# Patient Record
Sex: FEMALE | Race: WHITE | NOT HISPANIC OR LATINO | URBAN - METROPOLITAN AREA
[De-identification: names, ages, dates, MRNs, and addresses within clinical notes are randomized per-mention and may not be internally consistent; named-entity substitution may affect disease eponyms.]

---
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
## 2018-01-13 VITALS
HEIGHT: 65 IN | HEART RATE: 76 BPM | WEIGHT: 123.3 LBS | RESPIRATION RATE: 16 BRPM | TEMPERATURE: 98.2 F | BODY MASS INDEX: 20.54 KG/M2 | SYSTOLIC BLOOD PRESSURE: 118 MMHG | DIASTOLIC BLOOD PRESSURE: 60 MMHG

## 2018-01-14 VITALS
SYSTOLIC BLOOD PRESSURE: 116 MMHG | HEART RATE: 100 BPM | DIASTOLIC BLOOD PRESSURE: 70 MMHG | TEMPERATURE: 98.3 F | BODY MASS INDEX: 21.96 KG/M2 | RESPIRATION RATE: 14 BRPM | WEIGHT: 131.8 LBS | HEIGHT: 65 IN

## 2018-01-14 NOTE — MISCELLANEOUS
Provider Comments  Patient a no show for 06/30/17 OV; letter sent   C Draper        Signatures   Electronically signed by : Vernell Velasquez, [facility]; Jul  3 2017  9:42AM EST                       (Author)

## 2018-06-16 DIAGNOSIS — Z30.9 ENCOUNTER FOR CONTRACEPTIVE MANAGEMENT, UNSPECIFIED TYPE: Primary | ICD-10-CM

## 2018-06-16 RX ORDER — DROSPIRENONE AND ETHINYL ESTRADIOL TABLETS 0.02-3(28)
KIT ORAL
Refills: 0 | COMMUNITY
Start: 2018-05-14 | End: 2018-06-16 | Stop reason: SDUPTHER

## 2018-06-16 RX ORDER — DROSPIRENONE AND ETHINYL ESTRADIOL TABLETS 0.02-3(28)
KIT ORAL
Qty: 28 TABLET | Refills: 0 | Status: SHIPPED | OUTPATIENT
Start: 2018-06-16 | End: 2018-06-28 | Stop reason: SDUPTHER

## 2018-06-16 NOTE — PROGRESS NOTES
Patient called for birth control pills because she is post to start tomorrow Sunday     Patient not seen in greater than a year so 1 pack will be called in and then she needs to follow up with Gallito Gramajo prior to another p

## 2018-06-16 NOTE — TELEPHONE ENCOUNTER
Pt needs her birth control  Will inform patient she needs to make an appointment to see Keenan Ho and we will send one pack to the pharmacy   fyi per dr Frandy Basurto

## 2018-06-18 RX ORDER — DROSPIRENONE AND ETHINYL ESTRADIOL TABLETS 0.02-3(28)
KIT ORAL
Qty: 28 TABLET | Refills: 0 | OUTPATIENT
Start: 2018-06-18

## 2018-06-28 ENCOUNTER — OFFICE VISIT (OUTPATIENT)
Dept: FAMILY MEDICINE CLINIC | Facility: CLINIC | Age: 18
End: 2018-06-28
Payer: COMMERCIAL

## 2018-06-28 VITALS
HEART RATE: 80 BPM | DIASTOLIC BLOOD PRESSURE: 72 MMHG | HEIGHT: 65 IN | WEIGHT: 122.6 LBS | RESPIRATION RATE: 16 BRPM | BODY MASS INDEX: 20.43 KG/M2 | SYSTOLIC BLOOD PRESSURE: 110 MMHG

## 2018-06-28 DIAGNOSIS — L70.9 ACNE, UNSPECIFIED ACNE TYPE: ICD-10-CM

## 2018-06-28 DIAGNOSIS — Z30.9 ENCOUNTER FOR CONTRACEPTIVE MANAGEMENT, UNSPECIFIED TYPE: ICD-10-CM

## 2018-06-28 DIAGNOSIS — Z00.00 ROUTINE ADULT HEALTH MAINTENANCE: Primary | ICD-10-CM

## 2018-06-28 PROCEDURE — 99395 PREV VISIT EST AGE 18-39: CPT | Performed by: PHYSICIAN ASSISTANT

## 2018-06-28 RX ORDER — MINOCYCLINE HYDROCHLORIDE 65 MG/1
1 TABLET, FILM COATED, EXTENDED RELEASE ORAL DAILY
COMMUNITY
End: 2018-06-28 | Stop reason: ALTCHOICE

## 2018-06-28 RX ORDER — DROSPIRENONE AND ETHINYL ESTRADIOL TABLETS 0.02-3(28)
KIT ORAL
Qty: 84 TABLET | Refills: 3 | Status: SHIPPED | OUTPATIENT
Start: 2018-06-28 | End: 2019-05-29 | Stop reason: SDUPTHER

## 2018-06-28 RX ORDER — ALBUTEROL SULFATE 90 UG/1
2 AEROSOL, METERED RESPIRATORY (INHALATION)
COMMUNITY
Start: 2013-12-27

## 2018-06-28 NOTE — PROGRESS NOTES
Assessment/Plan:    1  Physical     - conducted, immunizations up to date    2  Encounter for contraceptive management, unspecified type  We have refilled your birth control  Please keep taking this daily and continue to use condoms  Your BP was excellent this visit  - LORYNA 3-0 02 MG per tablet; 1 daily  Dispense: 84 tablet; Refill: 32     3  Acne  Please stop taking your minocycline for acne as this can interfere with your oral contraceptives  You can use topical facial washes  If this begins to be a problem please call and we will adjust your acne prevention regimen  Subjective:   Chief Complaint   Patient presents with    Follow-up     for meds       Patient ID: Alessandro Watkins is a 21 y o  female  Patient presents today for routine physical and birth control pill renewal  She is currently in college in [place] and is no longer going to be in the dorm setting  She reports a healthy lifestyle including diet and exercise  She reports that she is sexually active with condom use every time  She reports taking her birth control every day without missing doses  She reports light cramping during period which lasts about 5 days  Denies break through bleeding  Denies issues with bowel or bladder, denies vaginal discharge or pelvic pain  Denies history of migraine with aura  She reports being on Minocycline which was prescribed by her dermatologist for issues with acne and is currently taking that along with her birth control pills  She reports light break outs when she does not take this but states "they're not that bad"  She reports a history of accutane use           The following portions of the patient's history were reviewed and updated as appropriate: allergies, current medications, past family history, past medical history, past social history, past surgical history and problem list     Past Medical History:   Diagnosis Date    Hordeolum externum      Past Surgical History:   Procedure Laterality Date    WISDOM TOOTH EXTRACTION       Family History   Problem Relation Age of Onset    Hypertension Mother     No Known Problems Father     Substance Abuse Neg Hx     Mental illness Neg Hx      Social History     Social History    Marital status: Single     Spouse name: N/A    Number of children: N/A    Years of education: N/A     Occupational History    Not on file  Social History Main Topics    Smoking status: Never Smoker    Smokeless tobacco: Never Used      Comment: Last Assessed 35PLD2110    Alcohol use Yes      Comment: Occationally     Drug use: No    Sexual activity: Not on file     Other Topics Concern    Not on file     Social History Narrative    Always uses seat belt    Has smoke detectors    Occasional caffeine consumption       Current Outpatient Prescriptions:     albuterol (VENTOLIN HFA) 90 mcg/act inhaler, Inhale 2 puffs, Disp: , Rfl:     LORYNA 3-0 02 MG per tablet, 1 daily, Disp: 84 tablet, Rfl: 3    Review of Systems   Constitutional: Negative  HENT: Negative  Eyes: Negative  Respiratory: Negative  Cardiovascular: Negative  Gastrointestinal: Negative  Endocrine: Negative  Genitourinary: Negative  Musculoskeletal: Negative  Skin: Negative  Allergic/Immunologic: Negative  Neurological: Negative  Hematological: Negative  Psychiatric/Behavioral: Negative                Objective:    Vitals:    06/28/18 0943   BP: 110/72   BP Location: Left arm   Patient Position: Sitting   Cuff Size: Standard   Pulse: 80   Resp: 16   Weight: 55 6 kg (122 lb 9 6 oz)   Height: 5' 4 75" (1 645 m)     BP Readings from Last 3 Encounters:   06/28/18 110/72   11/22/17 116/70   06/24/17 118/60      Pulse Readings from Last 3 Encounters:   06/28/18 80   11/22/17 100   06/24/17 76     Wt Readings from Last 3 Encounters:   06/28/18 55 6 kg (122 lb 9 6 oz) (40 %, Z= -0 25)*   11/22/17 59 8 kg (131 lb 12 8 oz) (60 %, Z= 0 26)*   06/24/17 55 9 kg (123 lb 4 8 oz) (46 %, Z= -0 09)*     * Growth percentiles are based on [facility] 2-20 Years data  Physical Exam   Constitutional: She is oriented to person, place, and time  She appears well-developed and well-nourished  HENT:   Head: Normocephalic and atraumatic  Right Ear: External ear normal    Left Ear: External ear normal    Mouth/Throat: Oropharynx is clear and moist    Eyes: Conjunctivae are normal  Pupils are equal, round, and reactive to light  Neck: Normal range of motion  Neck supple  Cardiovascular: Normal rate, regular rhythm, normal heart sounds and intact distal pulses  Pulmonary/Chest: Effort normal and breath sounds normal    Abdominal: Soft  Bowel sounds are normal    Musculoskeletal: Normal range of motion  Neurological: She is alert and oriented to person, place, and time  She has normal reflexes  Skin: Skin is warm and dry  Psychiatric: She has a normal mood and affect   Her behavior is normal  Judgment and thought content normal

## 2018-10-25 NOTE — PROGRESS NOTES
Assessment    1  Never a smoker   2  Well child visit (V20 2) (Z00 129)    Plan  Contraception    · Vestura 3-0 02 MG Oral Tablet; take one tablet by mouth every day  Health Maintenance    · Follow-up visit in 1 year Evaluation and Treatment  Follow-up  Status: Hold For -  Scheduling  Requested for: [id number]    Discussion/Summary    Impression:   No growth, development, elimination, feeding, skin and sleep concerns  no medical problems  Anticipatory guidance addressed as per the history of present illness section  No vaccines needed  No medication changes  Information discussed with patient  Physical - conducted, immunizations up to date    f/u 1 year or sooner if needed  Chief Complaint  Pt presents to the office for her physical  Would like to have her Vestura refilled      History of Present Illness  HM, 12-18 years Female (Brief): Mike Lim presents today for routine health maintenance with her mother  General Health: The child's health since the last visit is described as good  Dental hygiene: Good  Immunization status: Up to date  Caregiver concerns:   Menstrual status: The patient is menarcheal    Nutrition/Elimination:   Diet:  her current diet is diverse and healthy  The patient does not use dietary supplements  No elimination issues are expressed  Sleep:  No sleep issues are reported  Behavior: The child's temperament is described as calm, happy and independent  No behavior issues identified  Health Risks:  No significant risk factors are identified  no tuberculosis risk factors  Safety elements used:   safety elements were discussed and are adequate  Weekly activity: she gets exercise 2-3 times per week and <2 hour(s) of screen time per day  Childcare/School: She is in grade 12 in [employer]  School performance has been excellent  Sports Participation Questions:   HPI: Patient here for physical  No complaints        Review of Toradol 60 mg IM in the office  Start Prozac 20 mg daily  Continue Zanaflex as needed   Follow up with Dr. Castillo in one month    Systems    Constitutional: No complaints of fever or chills, feels well, no tiredness, no recent weight gain or loss  Eyes: No complaints of eye pain, no discharge, no eyesight problems, eyes do not itch, no red or dry eyes  ENT: no complaints of nasal discharge, no hoarseness, no earache, no nosebleeds, no loss of hearing, no sore throat  Cardiovascular: No complaints of chest pain, no palpitations, normal heart rate, no lower extremity edema  Respiratory: No complaints of cough, no shortness of breath, no wheezing, no leg claudication  Gastrointestinal: No complaints of abdominal pain, no nausea or vomiting, no constipation, no diarrhea or bloody stools  Genitourinary: No complaints of incontinence, no pelvic pain, no dysuria or dysmenorrhea, no abnormal vaginal bleeding or vaginal discharge  Musculoskeletal: No complaints of limb swelling or limb pain, no myalgias, no joint swelling or joint stiffness  Integumentary: No complaints of skin rash, no skin lesions or wounds, no itching, no breast pain, no breast lump  Neurological: No complaints of headache, no numbness or tingling, no confusion, no dizziness, no limb weakness, no convulsions or fainting, no difficulty walking  Psychiatric: No complaints of feeling depressed, no suicidal thoughts, no emotional problems, no anxiety, no sleep disturbances, no change in personality  Endocrine: No complaints of feeling weak, no muscle weakness, no deepening of voice, no hot flashes or proptosis  Hematologic/Lymphatic: No complaints of swollen glands, no neck swollen glands, does not bleed or bruise easily  ROS reported by the patient  Active Problems    1  Acne (706 1) (L70 9)   2  Contraception (V25 9) (Z30 9)   3  Exercise-induced bronchospasm (493 81) (J45 990)   4  General counseling and advice for contraceptive management (V25 09) (Z30 09)   5  Need for HPV vaccination (V04 89) (Z23)   6   Need for prophylactic vaccination and inoculation against influenza (V04 81) (Z23)    Past Medical History    · History of Acute tonsillitis (463) (J03 90)   · History of Cough (786 2) (R05)   · History of acute pharyngitis (V12 69) (Z87 09)   · History of Hordeolum externum, unspecified laterality (373 11) (H00 019)   · Need for prophylactic vaccination and inoculation against influenza (V04 81) (Z23)   · History of Pharyngitis (462) (J02 9)   · History of Referred Earache (388 72)   · Upper respiratory infection (465 9) (J06 9)   · History of Viral URI (465 9) (J06 9,B97 89)    Surgical History    · Denied: History Of Prior Surgery    Family History  Mother    · Family history of Family Health Status Of Mother - Good  Father    · Family history of Family Health Status Of Father - Good    Social History    · Always uses seat belt   · Has smoke detectors   · Never a smoker   · No alcohol use   · No drug use   · Occasional caffeine consumption    Current Meds   1  Solodyn 65 MG Oral Tablet Extended Release 24 Hour; Therapy: (Recorded:56Guz0905) to Recorded   2  Ventolin  (90 Base) MCG/ACT Inhalation Aerosol Solution; INHALE 2 PUFFS 15   MINUTES PRIOR TO EXERCISE; Therapy: 53EZE7977 to (Last [id number])  Requested for: 11RHW9026 Ordered   3  Vestura 3-0 02 MG Oral Tablet; take one tablet by mouth every day; Therapy: 46XIY6687 to (Evaluate:73Qvp3500)  Requested for: 25Fbj1636; Last   Rx:47Lzr8723 Ordered    Allergies    1  No Known Drug Allergies    2  No Known Environmental Allergies   3   No Known Food Allergies    Vitals   Recorded: 27KUY6643 96:30BJ   Systolic 412, RUE, Sitting   Diastolic 62, RUE, Sitting   Heart Rate 60, R Radial   Pulse Quality Normal, R Radial   Respiration Quality Normal   Respiration 14   Height 5 ft 4 75 in   Weight 119 lb 1 6 oz   BMI Calculated 19 97   BSA Calculated 1 58   BMI Percentile 34 %   2-20 Stature Percentile 58 %   2-20 Weight Percentile 42 %     Physical Exam    Constitutional - General appearance: No acute distress, well appearing and well nourished  Head and Face - Head and face: Normocephalic, atraumatic  Eyes - Conjunctiva and lids: No injection, edema or discharge  Pupils and irises: Equal, round, reactive to light bilaterally  Ears, Nose, Mouth, and Throat - External inspection of ears and nose: Normal without deformities or discharge  Otoscopic examination: Tympanic membranes gray, translucent with good bony landmarks and light reflex  Canals patent without erythema  Hearing: Normal  Nasal mucosa, septum, and turbinates: Normal, no edema or discharge  Lips, teeth, and gums: Normal, good dentition  Oropharynx: Moist mucosa, normal tongue and tonsils without lesions  Neck - Neck: Supple, symmetric, no masses  Thyroid: No thyromegaly  Pulmonary - Respiratory effort: Normal respiratory rate and rhythm, no increased work of breathing  Palpation of chest: Normal  Auscultation of lungs: Clear bilaterally  Cardiovascular - Palpation of heart: Normal PMI, no thrill  Auscultation of heart: Regular rate and rhythm, normal S1 and S2, no murmur  Pedal pulses: Normal, 2+ bilaterally  Examination of extremities for edema and/or varicosities: Normal    Abdomen - Abdomen: Normal bowel sounds, soft, non-tender, no masses  Liver and spleen: No hepatomegaly or splenomegaly  Lymphatic - Palpation of lymph nodes in neck: No anterior or posterior cervical lymphadenopathy  Musculoskeletal - Gait and station: Normal gait  Digits and nails: Normal without clubbing or cyanosis  Inspection/palpation of joints, bones, and muscles: Normal  Evaluation for scoliosis: No scoliosis on exam  Range of motion: Normal  Stability: No joint instability  Muscle strength/tone: Normal    Skin - Skin and subcutaneous tissue: Normal  Palpation of skin and subcutaneous tissue: No rash or lesions     Neurologic - Cranial nerves: Normal  Reflexes: Normal    Psychiatric - judgment and insight: Normal  Mood and affect: Normal       Future Appointments    Date/Time Provider Specialty Site   08/24/2016 10:30 AM PANAKJ Mckeon  [address] [email or web address]     Signatures   Electronically signed by : Opal Benton [facility];  Aug 22 2016  3:44PM EST                       (Author)    Electronically signed by : PANKAJ Grace ; Aug 22 2016  3:55PM EST                       (Author)

## 2019-03-15 ENCOUNTER — OFFICE VISIT (OUTPATIENT)
Dept: FAMILY MEDICINE CLINIC | Facility: CLINIC | Age: 19
End: 2019-03-15
Payer: COMMERCIAL

## 2019-03-15 VITALS
RESPIRATION RATE: 18 BRPM | WEIGHT: 120.5 LBS | DIASTOLIC BLOOD PRESSURE: 76 MMHG | TEMPERATURE: 98.1 F | SYSTOLIC BLOOD PRESSURE: 118 MMHG | BODY MASS INDEX: 20.57 KG/M2 | HEART RATE: 85 BPM | HEIGHT: 64 IN

## 2019-03-15 DIAGNOSIS — R05.9 COUGH: ICD-10-CM

## 2019-03-15 DIAGNOSIS — J06.9 VIRAL URI WITH COUGH: Primary | ICD-10-CM

## 2019-03-15 PROCEDURE — 1036F TOBACCO NON-USER: CPT | Performed by: PHYSICIAN ASSISTANT

## 2019-03-15 PROCEDURE — 3008F BODY MASS INDEX DOCD: CPT | Performed by: PHYSICIAN ASSISTANT

## 2019-03-15 PROCEDURE — 99213 OFFICE O/P EST LOW 20 MIN: CPT | Performed by: PHYSICIAN ASSISTANT

## 2019-03-15 PROCEDURE — 94150 VITAL CAPACITY TEST: CPT | Performed by: PHYSICIAN ASSISTANT

## 2019-03-15 RX ORDER — PREDNISONE 20 MG/1
20 TABLET ORAL 2 TIMES DAILY WITH MEALS
Qty: 10 TABLET | Refills: 0 | Status: SHIPPED | OUTPATIENT
Start: 2019-03-15

## 2019-03-15 NOTE — PROGRESS NOTES
Assessment/Plan:    1  Viral URI with cough    - start fluids, rest, start prednisone 20 mg 1 tab twice daily for 5 days  - predniSONE 20 mg tablet; Take 1 tablet (20 mg total) by mouth 2 (two) times a day with meals  Dispense: 10 tablet; Refill: 0    F/u as needed    Subjective:   Chief Complaint   Patient presents with    Cough    Fatigue      Patient ID: Cam Dickerson is a 19 y o  female  Last week patient with a cold of mucus and congestion, taking DayQuil and NyQuil, the came home this week for spring break getting ready to leave this weekend and concerned she has been sick for 2 weeks  Asthma has been acting up also and patient has needed to use her rescue inhaler  Mucus stuck in throat  Coughing productive  Denies fever, chills  The following portions of the patient's history were reviewed and updated as appropriate: allergies, current medications, past family history, past medical history, past social history, past surgical history and problem list     Past Medical History:   Diagnosis Date    Hordeolum externum      Past Surgical History:   Procedure Laterality Date    WISDOM TOOTH EXTRACTION       Family History   Problem Relation Age of Onset    Hypertension Mother     No Known Problems Father     Substance Abuse Neg Hx     Mental illness Neg Hx      Social History     Socioeconomic History    Marital status: Single     Spouse name: Not on file    Number of children: Not on file    Years of education: Not on file    Highest education level: Not on file   Occupational History    Not on file   Social Needs    Financial resource strain: Not on file    Food insecurity:     Worry: Not on file     Inability: Not on file    Transportation needs:     Medical: Not on file     Non-medical: Not on file   Tobacco Use    Smoking status: Never Smoker    Smokeless tobacco: Never Used    Tobacco comment: Last Assessed [id number]   Substance and Sexual Activity    Alcohol use:  Yes Comment: Occationally     Drug use: No    Sexual activity: Not on file   Lifestyle    Physical activity:     Days per week: Not on file     Minutes per session: Not on file    Stress: Not on file   Relationships    Social connections:     Talks on phone: Not on file     Gets together: Not on file     Attends Church service: Not on file     Active member of club or organization: Not on file     Attends meetings of clubs or organizations: Not on file     Relationship status: Not on file    Intimate partner violence:     Fear of current or ex partner: Not on file     Emotionally abused: Not on file     Physically abused: Not on file     Forced sexual activity: Not on file   Other Topics Concern    Not on file   Social History Narrative    Always uses seat belt    Has smoke detectors    Occasional caffeine consumption       Current Outpatient Medications:     albuterol (VENTOLIN HFA) 90 mcg/act inhaler, Inhale 2 puffs, Disp: , Rfl:     LORYNA 3-0 02 MG per tablet, 1 daily, Disp: 84 tablet, Rfl: 3    Review of Systems          Objective:    Vitals:    03/15/19 1238   BP: 118/76   BP Location: Right arm   Patient Position: Sitting   Cuff Size: Standard   Pulse: 85   Resp: 18   Temp: 98 1 °F (36 7 °C)   TempSrc: Oral   Weight: 54 7 kg (120 lb 8 oz)   Height: 5' 4" (1 626 m)        Physical Exam   Constitutional: She is oriented to person, place, and time  She appears well-developed and well-nourished  HENT:   Head: Normocephalic and atraumatic  Right Ear: Tympanic membrane, external ear and ear canal normal    Left Ear: Tympanic membrane, external ear and ear canal normal    Nose: Mucosal edema and rhinorrhea present  Mouth/Throat: Oropharynx is clear and moist  No oropharyngeal exudate or posterior oropharyngeal erythema  Cardiovascular: Normal rate, regular rhythm and normal heart sounds  Pulmonary/Chest: Effort normal and breath sounds normal    Lymphadenopathy:     She has no cervical adenopathy  Neurological: She is alert and oriented to person, place, and time  Skin: Skin is warm  Psychiatric: She has a normal mood and affect   Judgment normal

## 2019-05-29 DIAGNOSIS — Z30.9 ENCOUNTER FOR CONTRACEPTIVE MANAGEMENT, UNSPECIFIED TYPE: ICD-10-CM

## 2019-05-29 RX ORDER — DROSPIRENONE AND ETHINYL ESTRADIOL 0.02-3(28)
KIT ORAL
Qty: 84 TABLET | Refills: 0 | Status: SHIPPED | OUTPATIENT
Start: 2019-05-29

## 2020-07-22 ENCOUNTER — TELEPHONE (OUTPATIENT)
Dept: FAMILY MEDICINE CLINIC | Facility: CLINIC | Age: 20
End: 2020-07-22